# Patient Record
Sex: FEMALE | Race: WHITE | Employment: OTHER | URBAN - METROPOLITAN AREA
[De-identification: names, ages, dates, MRNs, and addresses within clinical notes are randomized per-mention and may not be internally consistent; named-entity substitution may affect disease eponyms.]

---

## 2021-07-13 ENCOUNTER — APPOINTMENT (OUTPATIENT)
Dept: CT IMAGING | Age: 86
End: 2021-07-13
Attending: PHYSICIAN ASSISTANT
Payer: MEDICARE

## 2021-07-13 ENCOUNTER — HOSPITAL ENCOUNTER (EMERGENCY)
Age: 86
Discharge: HOME | End: 2021-07-13
Attending: EMERGENCY MEDICINE
Payer: MEDICARE

## 2021-07-13 ENCOUNTER — APPOINTMENT (OUTPATIENT)
Dept: GENERAL RADIOLOGY | Age: 86
End: 2021-07-13
Attending: EMERGENCY MEDICINE
Payer: MEDICARE

## 2021-07-13 VITALS
DIASTOLIC BLOOD PRESSURE: 78 MMHG | TEMPERATURE: 98.6 F | WEIGHT: 205 LBS | HEART RATE: 71 BPM | RESPIRATION RATE: 16 BRPM | OXYGEN SATURATION: 98 % | SYSTOLIC BLOOD PRESSURE: 156 MMHG

## 2021-07-13 DIAGNOSIS — F02.80 LEWY BODY DEMENTIA WITHOUT BEHAVIORAL DISTURBANCE (HCC): Primary | ICD-10-CM

## 2021-07-13 DIAGNOSIS — K44.9 HIATAL HERNIA: ICD-10-CM

## 2021-07-13 DIAGNOSIS — G31.83 LEWY BODY DEMENTIA WITHOUT BEHAVIORAL DISTURBANCE (HCC): Primary | ICD-10-CM

## 2021-07-13 LAB
ALBUMIN SERPL-MCNC: 3.3 G/DL (ref 3.4–5)
ALBUMIN/GLOB SERPL: 0.8 {RATIO} (ref 0.8–1.7)
ALP SERPL-CCNC: 83 U/L (ref 45–117)
ALT SERPL-CCNC: 20 U/L (ref 13–56)
ANION GAP SERPL CALC-SCNC: 7 MMOL/L (ref 3–18)
APPEARANCE UR: CLEAR
ARTERIAL PATENCY WRIST A: POSITIVE
AST SERPL-CCNC: 16 U/L (ref 10–38)
BASE EXCESS BLD CALC-SCNC: 1.5 MMOL/L
BASOPHILS # BLD: 0.1 K/UL (ref 0–0.1)
BASOPHILS NFR BLD: 1 % (ref 0–2)
BDY SITE: ABNORMAL
BILIRUB SERPL-MCNC: 0.6 MG/DL (ref 0.2–1)
BILIRUB UR QL: NEGATIVE
BUN SERPL-MCNC: 22 MG/DL (ref 7–18)
BUN/CREAT SERPL: 31 (ref 12–20)
CALCIUM SERPL-MCNC: 8.9 MG/DL (ref 8.5–10.1)
CHLORIDE SERPL-SCNC: 101 MMOL/L (ref 100–111)
CK MB CFR SERPL CALC: 3.8 % (ref 0–4)
CK MB SERPL-MCNC: 1.1 NG/ML (ref 5–25)
CK SERPL-CCNC: 29 U/L (ref 26–192)
CO2 SERPL-SCNC: 31 MMOL/L (ref 21–32)
COLOR UR: ABNORMAL
CREAT SERPL-MCNC: 0.72 MG/DL (ref 0.6–1.3)
D DIMER PPP FEU-MCNC: 1.03 UG/ML(FEU)
DIFFERENTIAL METHOD BLD: ABNORMAL
EOSINOPHIL # BLD: 0.3 K/UL (ref 0–0.4)
EOSINOPHIL NFR BLD: 4 % (ref 0–5)
ERYTHROCYTE [DISTWIDTH] IN BLOOD BY AUTOMATED COUNT: 14.5 % (ref 11.6–14.5)
GAS FLOW.O2 O2 DELIVERY SYS: ABNORMAL L/MIN
GLOBULIN SER CALC-MCNC: 4.4 G/DL (ref 2–4)
GLUCOSE SERPL-MCNC: 104 MG/DL (ref 74–99)
GLUCOSE UR STRIP.AUTO-MCNC: NEGATIVE MG/DL
HCO3 BLD-SCNC: 26.7 MMOL/L (ref 22–26)
HCT VFR BLD AUTO: 48.3 % (ref 35–45)
HGB BLD-MCNC: 15.5 G/DL (ref 12–16)
HGB UR QL STRIP: NEGATIVE
KETONES UR QL STRIP.AUTO: ABNORMAL MG/DL
LACTATE BLD-SCNC: 1.04 MMOL/L (ref 0.4–2)
LEUKOCYTE ESTERASE UR QL STRIP.AUTO: NEGATIVE
LYMPHOCYTES # BLD: 2.2 K/UL (ref 0.9–3.6)
LYMPHOCYTES NFR BLD: 24 % (ref 21–52)
MCH RBC QN AUTO: 30.2 PG (ref 24–34)
MCHC RBC AUTO-ENTMCNC: 32.1 G/DL (ref 31–37)
MCV RBC AUTO: 94 FL (ref 74–97)
MONOCYTES # BLD: 0.7 K/UL (ref 0.05–1.2)
MONOCYTES NFR BLD: 8 % (ref 3–10)
NEUTS SEG # BLD: 5.7 K/UL (ref 1.8–8)
NEUTS SEG NFR BLD: 64 % (ref 40–73)
NITRITE UR QL STRIP.AUTO: NEGATIVE
O2/TOTAL GAS SETTING VFR VENT: 21 %
PCO2 BLD: 42.9 MMHG (ref 35–45)
PH BLD: 7.4 [PH] (ref 7.35–7.45)
PH UR STRIP: 6 [PH] (ref 5–8)
PLATELET # BLD AUTO: 246 K/UL (ref 135–420)
PMV BLD AUTO: 10.9 FL (ref 9.2–11.8)
PO2 BLD: 55 MMHG (ref 80–100)
POTASSIUM SERPL-SCNC: 3.5 MMOL/L (ref 3.5–5.5)
PROT SERPL-MCNC: 7.7 G/DL (ref 6.4–8.2)
PROT UR STRIP-MCNC: NEGATIVE MG/DL
RBC # BLD AUTO: 5.14 M/UL (ref 4.2–5.3)
SAO2 % BLD: 88.3 % (ref 92–97)
SERVICE CMNT-IMP: ABNORMAL
SODIUM SERPL-SCNC: 139 MMOL/L (ref 136–145)
SP GR UR REFRACTOMETRY: 1.02 (ref 1–1.03)
SPECIMEN TYPE: ABNORMAL
TROPONIN I SERPL-MCNC: <0.02 NG/ML (ref 0–0.04)
UROBILINOGEN UR QL STRIP.AUTO: 1 EU/DL (ref 0.2–1)
WBC # BLD AUTO: 9 K/UL (ref 4.6–13.2)

## 2021-07-13 PROCEDURE — 74011000636 HC RX REV CODE- 636: Performed by: EMERGENCY MEDICINE

## 2021-07-13 PROCEDURE — 71045 X-RAY EXAM CHEST 1 VIEW: CPT

## 2021-07-13 PROCEDURE — 99285 EMERGENCY DEPT VISIT HI MDM: CPT

## 2021-07-13 PROCEDURE — 85025 COMPLETE CBC W/AUTO DIFF WBC: CPT

## 2021-07-13 PROCEDURE — 87040 BLOOD CULTURE FOR BACTERIA: CPT

## 2021-07-13 PROCEDURE — 82803 BLOOD GASES ANY COMBINATION: CPT

## 2021-07-13 PROCEDURE — 80053 COMPREHEN METABOLIC PANEL: CPT

## 2021-07-13 PROCEDURE — 81003 URINALYSIS AUTO W/O SCOPE: CPT

## 2021-07-13 PROCEDURE — 82553 CREATINE MB FRACTION: CPT

## 2021-07-13 PROCEDURE — 83605 ASSAY OF LACTIC ACID: CPT

## 2021-07-13 PROCEDURE — 36600 WITHDRAWAL OF ARTERIAL BLOOD: CPT

## 2021-07-13 PROCEDURE — 85379 FIBRIN DEGRADATION QUANT: CPT

## 2021-07-13 PROCEDURE — 71275 CT ANGIOGRAPHY CHEST: CPT

## 2021-07-13 PROCEDURE — 93005 ELECTROCARDIOGRAM TRACING: CPT

## 2021-07-13 PROCEDURE — 70450 CT HEAD/BRAIN W/O DYE: CPT

## 2021-07-13 RX ORDER — LEVOTHYROXINE SODIUM 100 UG/1
TABLET ORAL
COMMUNITY

## 2021-07-13 RX ORDER — LORAZEPAM 0.5 MG/1
0.5 TABLET ORAL
COMMUNITY

## 2021-07-13 RX ORDER — HYDROCHLOROTHIAZIDE 25 MG/1
25 TABLET ORAL DAILY
COMMUNITY

## 2021-07-13 RX ORDER — TRAZODONE HYDROCHLORIDE 50 MG/1
50 TABLET ORAL
COMMUNITY

## 2021-07-13 RX ADMIN — IOPAMIDOL 100 ML: 755 INJECTION, SOLUTION INTRAVENOUS at 15:50

## 2021-07-13 NOTE — ED TRIAGE NOTES
Patient with recent diagnosis of pneumonia. Patient with increased weakness that is getting worse. Patient with less responsiveness and confusion.   Patient was sent here by PCP

## 2021-07-13 NOTE — ED PROVIDER NOTES
EMERGENCY DEPARTMENT HISTORY AND PHYSICAL EXAM    Date: 7/13/2021  Patient Name: Danish Painting    History of Presenting Illness     Chief Complaint   Patient presents with    Altered mental status         History Provided By: Patient's Daughter    1:23 PM  Danish Painting is a 80 y.o. female with PMHX of hypertension, dementia, anxiety, hypothyroidism who presents to the emergency department from PCPs office with daughter C/O change in mental status. Daughter states she was called from the memory unit at her assisted living facility for patient being less responsive today. She usually ambulates with some assistance but was not walking or being as interactive today. Daughter states she was diagnosed with pneumonia by PCP Dr. Meka Locke' 10 days ago, completed course of unknown antibiotic. She took her to Dr. Meka Locke' office today where she had a chest x-ray that showed possible residual pneumonia and was sent to the ED for further evaluation. Daughter states patient tested positive on New Year's Day for COVID-19, was asymptomatic. She then had COVID-19 vaccine x2. History is limited due to patient's dementia. PCP: Víctor Mireles MD    Current Outpatient Medications   Medication Sig Dispense Refill    hydroCHLOROthiazide (HYDRODIURIL) 25 mg tablet Take 25 mg by mouth daily.  levothyroxine (SYNTHROID) 100 mcg tablet Take  by mouth Daily (before breakfast).  LORazepam (ATIVAN) 0.5 mg tablet Take 0.5 mg by mouth.  traZODone (DESYREL) 50 mg tablet Take 50 mg by mouth nightly. Past History     Past Medical History:  Past Medical History:   Diagnosis Date    Anxiety     Dementia ([address]     Depressed     Hypertension     Hypothyroid        Past Surgical History:  Past Surgical History:   Procedure Laterality Date    HX HYSTERECTOMY         Family History:  History reviewed. No pertinent family history.     Social History:  Social History     Tobacco Use    Smoking status: Never Smoker    Smokeless tobacco: Never Used   Substance Use Topics    Alcohol use: Not Currently    Drug use: Not on file       Allergies: Allergies   Allergen Reactions    Donepezil Unknown (comments)    Sulfa (Sulfonamide Antibiotics) Rash         Review of Systems   Review of Systems   Unable to perform ROS: Dementia         Physical Exam     Vitals:    07/13/21 1239 07/13/21 1602 07/13/21 1607   BP: (!) 132/103 (!) 156/60    Pulse: 80  75   Resp: 20  18   Temp: 99.1 °F (37.3 °C)     SpO2: 90% 97% 100%   Weight: 93 kg (205 lb)       Physical Exam    Vital signs and nursing notes reviewed. CONSTITUTIONAL: Alert. Well-appearing; well-nourished; elderly female, in no apparent distress. HEAD: Normocephalic; atraumatic. EYES: PERRL; Conjunctiva clear. ENT:  Moist mucus membranes. CV: Normal S1, S2; no murmurs, rubs, or gallops. No chest wall tenderness. RESPIRATORY: Normal chest excursion with respiration; breath sounds clear and equal bilaterally; no wheezes, rhonchi, or rales. GI: Normal bowel sounds; non-distended; non-tender. BACK:  No evidence of trauma or deformity. EXT: Normal ROM in all four extremities; non-tender to palpation. No edema or calf tenderness  SKIN: Normal for age and race; warm; dry; good turgor; no apparent lesions or exudate. NEURO: Nonverbal, follows basic commands and makes appropriate eye contact. Moves all extremities with weakness to bilateral lower extremities.         Diagnostic Study Results     Labs -     Recent Results (from the past 12 hour(s))   EKG, 12 LEAD, INITIAL    Collection Time: 07/13/21  1:11 PM   Result Value Ref Range    Ventricular Rate 74 BPM    Atrial Rate 74 BPM    P-R Interval 178 ms    QRS Duration 68 ms    Q-T Interval 382 ms    QTC Calculation (Bezet) 424 ms    Calculated P Axis 63 degrees    Calculated R Axis 26 degrees    Calculated T Axis 74 degrees    Diagnosis       Normal sinus rhythm  Inferior infarct , possibly acute  Anterior infarct , age undetermined  ACUTE MI  Consider right ventricular involvement in acute inferior infarct  Abnormal ECG  No previous ECGs available     POC LACTIC ACID    Collection Time: 07/13/21  1:21 PM   Result Value Ref Range    Lactic Acid (POC) 1.04 0.40 - 2.00 mmol/L   CBC WITH AUTOMATED DIFF    Collection Time: 07/13/21  1:25 PM   Result Value Ref Range    WBC 9.0 4.6 - 13.2 K/uL    RBC 5.14 4.20 - 5.30 M/uL    HGB 15.5 12.0 - 16.0 g/dL    HCT 48.3 (H) 35.0 - 45.0 %    MCV 94.0 74.0 - 97.0 FL    MCH 30.2 24.0 - 34.0 PG    MCHC 32.1 31.0 - 37.0 g/dL    RDW 14.5 11.6 - 14.5 %    PLATELET 401 985 - 012 K/uL    MPV 10.9 9.2 - 11.8 FL    NEUTROPHILS 64 40 - 73 %    LYMPHOCYTES 24 21 - 52 %    MONOCYTES 8 3 - 10 %    EOSINOPHILS 4 0 - 5 %    BASOPHILS 1 0 - 2 %    ABS. NEUTROPHILS 5.7 1.8 - 8.0 K/UL    ABS. LYMPHOCYTES 2.2 0.9 - 3.6 K/UL    ABS. MONOCYTES 0.7 0.05 - 1.2 K/UL    ABS. EOSINOPHILS 0.3 0.0 - 0.4 K/UL    ABS. BASOPHILS 0.1 0.0 - 0.1 K/UL    DF AUTOMATED     METABOLIC PANEL, COMPREHENSIVE    Collection Time: 07/13/21  1:25 PM   Result Value Ref Range    Sodium 139 136 - 145 mmol/L    Potassium 3.5 3.5 - 5.5 mmol/L    Chloride 101 100 - 111 mmol/L    CO2 31 21 - 32 mmol/L    Anion gap 7 3.0 - 18 mmol/L    Glucose 104 (H) 74 - 99 mg/dL    BUN 22 (H) 7.0 - 18 MG/DL    Creatinine 0.72 0.6 - 1.3 MG/DL    BUN/Creatinine ratio 31 (H) 12 - 20      GFR est AA >60 >60 ml/min/1.73m2    GFR est non-AA >60 >60 ml/min/1.73m2    Calcium 8.9 8.5 - 10.1 MG/DL    Bilirubin, total 0.6 0.2 - 1.0 MG/DL    ALT (SGPT) 20 13 - 56 U/L    AST (SGOT) 16 10 - 38 U/L    Alk.  phosphatase 83 45 - 117 U/L    Protein, total 7.7 6.4 - 8.2 g/dL    Albumin 3.3 (L) 3.4 - 5.0 g/dL    Globulin 4.4 (H) 2.0 - 4.0 g/dL    A-G Ratio 0.8 0.8 - 1.7     CARDIAC PANEL,(CK, CKMB & TROPONIN)    Collection Time: 07/13/21  1:25 PM   Result Value Ref Range    CK - MB 1.1 <3.6 ng/ml    CK-MB Index 3.8 0.0 - 4.0 %    CK 29 26 - 192 U/L    Troponin-I, QT <0.02 0.0 - 0.045 NG/ML   D DIMER    Collection Time: 07/13/21  1:25 PM   Result Value Ref Range    D DIMER 1.03 (H) <0.46 ug/ml(FEU)   BLOOD GAS, ARTERIAL POC    Collection Time: 07/13/21  2:54 PM   Result Value Ref Range    Device: ROOM AIR      FIO2 (POC) 21 %    pH (POC) 7.40 7.35 - 7.45      pCO2 (POC) 42.9 35.0 - 45.0 MMHG    pO2 (POC) 55 (L) 80 - 100 MMHG    HCO3 (POC) 26.7 (H) 22 - 26 MMOL/L    sO2 (POC) 88.3 (L) 92 - 97 %    Base excess (POC) 1.5 mmol/L    Allens test (POC) Positive      Site RIGHT RADIAL      Specimen type (POC) ARTERIAL      Performed by Jessenia Salas    URINALYSIS W/ RFLX MICROSCOPIC    Collection Time: 07/13/21  3:27 PM   Result Value Ref Range    Color DARK YELLOW      Appearance CLEAR      Specific gravity 1.020 1.005 - 1.030      pH (UA) 6.0 5.0 - 8.0      Protein Negative NEG mg/dL    Glucose Negative NEG mg/dL    Ketone TRACE (A) NEG mg/dL    Bilirubin Negative NEG      Blood Negative NEG      Urobilinogen 1.0 0.2 - 1.0 EU/dL    Nitrites Negative NEG      Leukocyte Esterase Negative NEG         Radiologic Studies -   CTA CHEST W OR W WO CONT   Final Result      Limited study. No evidence of pulmonary embolism. No acute intrathoracic abnormality. Large hiatal hernia. CT HEAD WO CONT   Final Result      No acute intracranial abnormality. Marked cerebral atrophy with chronic microvascular ischemic changes. XR CHEST PORT   Final Result      Hypoinflated lungs. Bibasilar atelectasis. CT Results  (Last 48 hours)               07/13/21 1558  CTA CHEST W OR W WO CONT Final result    Impression:      Limited study. No evidence of pulmonary embolism. No acute intrathoracic abnormality. Large hiatal hernia. Narrative:  EXAM: CTA Chest       INDICATION: Shortness of breath. Possible PE.       COMPARISON: No prior study.        TECHNIQUE: Axial CT imaging from the thoracic inlet through the diaphragm with   intravenous contrast utilizing CTA study for pulmonary artery evaluation. Coronal and sagittal MIP reformations were generated at a separate workstation. One or more dose reduction techniques were used on this CT: automated exposure   control, adjustment of the mAs and/or kVp according to patient size, and   iterative reconstruction techniques. The specific techniques used on this CT   exam have been documented in the patient's electronic medical record. Digital   Imaging and Communications in Medicine (DICOM) format image data are available   to nonaffiliated external healthcare facilities or entities on a secure, media   free, reciprocally searchable basis with patient authorization for at least a   12-month period after this study. _______________       FINDINGS:       EXAM QUALITY: Overall exam quality is suboptimal. Respiratory motion artifact is   present, limiting evaluation. PULMONARY ARTERIES: No evidence of obvious central pulmonary embolism. Evaluation of subsegmental branches limited. MEDIASTINUM: Normal heart size. No evidence of right heart strain. Normal   caliber aorta with vascular calcifications. No pericardial effusion. Large   hiatal hernia. LYMPH NODES: No enlarged mediastinal or hilar nodes by size criteria. AIRWAY: Unremarkable. LUNGS: Bibasilar linear atelectasis/scarring. PLEURA: No pleural effusion or pneumothorax. UPPER ABDOMEN: Visualized upper abdomen is unremarkable. .       OTHER: No acute or aggressive osseous abnormalities identified. _______________           07/13/21 1438  CT HEAD WO CONT Final result    Impression:      No acute intracranial abnormality. Marked cerebral atrophy with chronic microvascular ischemic changes. Narrative:  EXAM: CT head       INDICATION: Altered mental status. COMPARISON: None.        TECHNIQUE: Axial CT imaging of the head was performed without intravenous   contrast. Standard multiplanar coronal and sagittal reformatted images were   obtained and are included in interpretation. One or more dose reduction techniques were used on this CT: automated exposure   control, adjustment of the mAs and/or kVp according to patient size, and   iterative reconstruction techniques. The specific techniques used on this CT   exam have been documented in the patient's electronic medical record. Digital   Imaging and Communications in Medicine (DICOM) format image data are available   to nonaffiliated external healthcare facilities or entities on a secure, media   free, reciprocally searchable basis with patient authorization for at least a   12-month period after this study. _______________       FINDINGS:       BRAIN AND POSTERIOR FOSSA: Marked cerebral atrophy. Patchy periventricular, deep   and subcortical white matter hypoattenuation which is nonspecific but likely   represents chronic ischemic changes. No evidence of acute large vessel   transcortical infarct or acute parenchymal hemorrhage. No midline shift or   hydrocephalus. EXTRA-AXIAL SPACES AND MENINGES: There are no abnormal extra-axial fluid   collections. CALVARIUM: Intact. SINUSES: Clear. OTHER: None.       _______________               CXR Results  (Last 48 hours)               07/13/21 1336  XR CHEST PORT Final result    Impression:      Hypoinflated lungs. Bibasilar atelectasis. Narrative:  EXAM: XR CHEST PORT       CLINICAL INDICATION/HISTORY: sepsis   -Additional: None       COMPARISON: None       TECHNIQUE: Portable frontal view of the chest       _______________       FINDINGS:       SUPPORT DEVICES: None. HEART AND MEDIASTINUM: Cardiomediastinal silhouette within normal limits. LUNGS AND PLEURAL SPACES: Hypoinflated lungs. Bibasilar atelectasis.  No large   effusion or pneumothorax.       _______________                 Medications given in the ED-  Medications   iopamidoL (ISOVUE-370) 76 % injection 100 mL (100 mL IntraVENous Given 7/13/21 1550)         Medical Decision Making   I am the first provider for this patient. I reviewed the vital signs, available nursing notes, past medical history, past surgical history, family history and social history. Vital Signs-Reviewed the patient's vital signs. Pulse Oximetry Analysis - 90% on RA       EKG interpretation: (Preliminary)  Normal sinus rhythm, rate 74, poor baseline, no STEMI    Records Reviewed: Nursing Notes      Procedures:  Procedures    ED Course:  1:23 PM   Initial assessment performed. The patients presenting problems have been discussed, and they are in agreement with the care plan formulated and outlined with them. I have encouraged them to ask questions as they arise throughout their visit. ED Course as of Jul 13 1749 Tue Jul 13, 2021   1302 I was personally available for consultation in the emergency department. Vivian Gordon MD       [BT]   21  Pt brought in by caretaker- kayden sx which seem to continue for 10 days    [BT]   18 Discussion with patient and family about goals of care, no obvious need for admission. They prefer not to admit the patient they understand that this could likely worsen her dementia symptoms. They will follow up with her primary care doctor for further work-up    [BT]      ED Course User Index  [BT] Gautam Lambert MD     Provider Notes: 66-year-old female with history of severe dementia, lives in a memory care unit sent in by staff and PCP for being less responsive than her normal.  Reportedly had recent clinical diagnosis of pneumonia, completed antibiotics. No other symptoms suggestive of pneumonia, has both had and been vaccinated against COVID-19. Her vitals are stable, exam is unrevealing, labs are unremarkable, no signs of infection. Her chest x-ray is normal and CTA does not reveal any pneumonia or PE.   Discussed with ED attending who also evaluated patient and agrees no acute abnormalities, possibly progression of her dementia. Will discharge back to her nursing facility and return precautions discussed. Daughter comfortable with plan. Diagnosis and Disposition       DISCHARGE NOTE:    Clive Huang  results have been reviewed with her. She has been counseled regarding her diagnosis, treatment, and plan. She verbally conveys understanding and agreement of the signs, symptoms, diagnosis, treatment and prognosis and additionally agrees to follow up as discussed. She also agrees with the care-plan and conveys that all of her questions have been answered. I have also provided discharge instructions for her that include: educational information regarding their diagnosis and treatment, and list of reasons why they would want to return to the ED prior to their follow-up appointment, should her condition change. She has been provided with education for proper emergency department utilization. CLINICAL IMPRESSION:    1. Lewy body dementia without behavioral disturbance ([address]    2. Hiatal hernia        PLAN:  1. D/C Home  2. Current Discharge Medication List        3. Follow-up Information     Follow up With Specialties Details Why Contact Info    Ermias Callahan MD Family Medicine Schedule an appointment as soon as possible for a visit   [address] [place] [id number] [phone]      [facility] EMERGENCY DEPT Emergency Medicine  As needed, If symptoms worsen [address] [id number] [phone]        _______________________________      Please note that this dictation was completed with [employer], the computer voice recognition software. Quite often unanticipated grammatical, syntax, homophones, and other interpretive errors are inadvertently transcribed by the computer software. Please disregard these errors. Please excuse any errors that have escaped final proofreading.

## 2021-07-13 NOTE — ED NOTES
Discharge paperwork given to [facility] S[facility] from [facility]. Vitals signs stable.

## 2021-07-14 LAB
ATRIAL RATE: 74 BPM
CALCULATED P AXIS, ECG09: 63 DEGREES
CALCULATED R AXIS, ECG10: 26 DEGREES
CALCULATED T AXIS, ECG11: 74 DEGREES
DIAGNOSIS, 93000: NORMAL
P-R INTERVAL, ECG05: 178 MS
Q-T INTERVAL, ECG07: 382 MS
QRS DURATION, ECG06: 68 MS
QTC CALCULATION (BEZET), ECG08: 424 MS
VENTRICULAR RATE, ECG03: 74 BPM

## 2021-07-19 LAB
BACTERIA SPEC CULT: NORMAL
BACTERIA SPEC CULT: NORMAL
SERVICE CMNT-IMP: NORMAL
SERVICE CMNT-IMP: NORMAL